# Patient Record
(demographics unavailable — no encounter records)

---

## 2025-03-11 NOTE — HISTORY OF PRESENT ILLNESS
[FreeTextEntry1] : CC: 5 y 1 mo right handed boy with polycystic kidney and liver disease and seizures. Herer for a first visit.  HPI: Geo's parents are seeking care in regard to seizure activity. On 2025, Geo had a first witnessed seizure. The seizure occurred out of the asleep state, and onset was not witnessed ( heard "strange noises" when walking near his bedroom and walked into him having a convulsion). The child was noted to have "blue lips, eye rolling, drooling", a small abrasion lesion on center-left forehead.  He was taken to [facility] ER and completed an EEG which was abnormal, with generalized epileptiform discharges noted (no seizures captured). He was sent home on generic Levetiracetam. No further overt seizures noted, but he is having occasional episodes of "spacing out", of unclear nature. He initially had some possible behavioral side effects from the generic Levetiracetam, but these are now resolved.  General health is good, but he is being followed due to recurrent GI symptoms (presumed to be related to milk protein allergy), polycystic liver and kidney disease, and Iron deficiency anemia. Sleep is good, through the night. Parents have an audio-video monitor in his bedroom, for safety. He usually sleeps from around 8:30 PM to around 7 AM. Developmentally, he is doing well, but he is receiving speech therapy and physical therapy. At 5 y 1 mo, Geo knows colors, speaks in sentences, has symbolic playskills, knows colors. Has been receiving multimodal therapies since early childhood (first via EI program, now receiving speech therapy and physical therapy) Academically, he is in PreK. Classroom has 8 students.  Current CNS medications: Generic Levetiractam 4 ml BID PRN intrarectal Diazepam as rescue   history: Born at [facility] via VD BW 6 p No  complications No NICU stay  Developmental history: First steps 18 mo First words 15 mo Toilet trained at 3 y Has been receiving multimodal therapies since early childhood (first via EI program, now receiving speech therapy and physical therapy)  Family history: Mom has polycystic liver and kidney disease Dad had ADD Maternal grandfather () had ALS and polycystic liver and kidney disease Paternal aunt with ADD Paternal grandfather with ADD  Social history: Lives with parents No pets Goes to school  Past medical history: Developmental delays Polycystic kidney and liver disease Milk protein allergy Generalized epilepsy Paroxysmal events of unclear nature ("spacing out")   Review of systems: General: No weight loss, weakness or recent fevers. Skin: No rashes, lumps, itching, color change, changes in hair/nails Head: No headaches, no head injury Eyes: No corrective eyeglasses. No discharge Ears: No discharge Nose/Sinuses: No congestion, discharge, epistaxis Mouth/Throat: Normal teeth and gums, no sore throat, hoarseness Neck: No lumps, pain, stiffness Respiratory: No cough, SOB, hemoptysis Cardiac: No edema GI: Loose stools : No hematuria Musculoskeletal: No joint inflammation or arthralgia Neuro: Seizures. Developmental lags. Psych: No mood, personality or behavioral concerns.  Physical Exam: HC 51.5 cm Well nourished non dysmorphic boy in no distress Face is symmetric Neck has full range of motion. No meningism. No torticollis or webbing Skin is clear of stigmata Hair has normal consistency, appearance, distribution Chest is symmetric Good air entry bilaterally. S1 S2 present, no murmur Abdomen soft, non tender, non distended Back has no deformities, no scoliosis, kyphosis or lordosis Awake, alert, great eye contact Speaks in full sentences Follows commands well Symbolic play skills Has joined attention Mimics social behavior Intact extraocular movements Pupils equal and reactive to light No nystagmus Unable to assess fundi Normal Rinne  Normal muscle tone and bulk   Muscle strength 5/5 in 4 limbs distally and proximally: walks, jumps, climbs, hops once each foot Romberg negative No dysmetria No ataxia No abnormal movements Gait is normal in stride, thong, and stance.   Tip-toe, heel and forward tandem walk intact No Gowers DTR 2+ in 4 limbs  Assessment: 5 y 1 mo right handed boy with early childhood onset mild developmental delays, milk protein allergy, Iron deficiency anemia, polycystic kidney and liver disease, generalized epilepsy, and paroxysmal events of unclear nature ("spacing out").  Plan: Geo's visit today had a duration of 60 minutes (>50% of which was spent in direct counseling and coordination of his care). I personally reviewed Geo's medical history, available outside medical records, tests results, recent developments, and then delineated next steps for his neurological care.  Geo's parents and I reviewed childhood onset generalized epilepsies in general, different treatment modalities, co morbidities and overall prognosis. Epilepsy is a chronic illness with potential for injury that poses a threat to life or bodily function.  We reviewed his current CNS medications' side effects profile, seizure action plan, acute management of breakthrough seizures with rescue medications, seizure precautions, medication adherence, common seizure triggers, nighttime safety, and the rationale behind monitoring trough levels.  1)	Continue generic Levetiracetam 4 ml BID 2)	Discontinue PRN intrarectal Diazepam as rescue 3)	PRN intrabuccal Libervant 5 mg as rescue for seizures over 3 minutes 4)	Brain MRI by summer/2025 5)	Referred to Neuro-genetics 6)	Levetiracetam trough level 7)	Audio-video monitor in his bedroom, for nighttime safety 8)	Continue multimodal therapies 9)	May need inpatient video EEG monitoring to capture and characterize the "spacing out" events, based on their evolution 10)	 Follow up 4 mo  Geo's parents understand plan, agree and want to move forward. All of their questions were answered. Geo's controlled substance history was obtained from the [place] state prescription monitoring program registry.  Laurie Levy MD Pediatric Neurologist and Clinical Neurophysiologist Director Pediatric Epilepsy [facility] at [facility] Clinical Professor of Neurology and Pediatrics, [facility] at [facility]

## 2025-06-27 NOTE — HISTORY OF PRESENT ILLNESS
[FreeTextEntry1] : CC: 5 y 4 mo right handed boy with early childhood onset mild developmental delays, milk protein allergy, Iron deficiency anemia, polycystic kidney and liver disease, generalized epilepsy, and paroxysmal events of unclear nature ("spacing out"). Here for a follow up visit.  Interval history; Since last seen, Geo's overt seizures have been well controlled (none seen since 2025). He is having occasional episodes of abrupt onset decreased relatedness, of unclear nature. Today's routine EEG ([facility]) revealed mild generalized background slowing. For seizure control, he is on monotherapy with generic Levetiracetam, without side effects.  General health is good, but he is being followed due to recurrent GI symptoms (presumed to be related to milk protein allergy), polycystic liver and kidney disease, and Iron deficiency anemia. Sleep is good, through the night. Parents have an audio-video monitor in his bedroom, for safety. He usually sleeps from around 8:30 PM to around 7 AM. Developmentally, he is doing well, but he is receiving speech therapy and physical therapy. At 5 y 4 mo, Geo knows colors, speaks in sentences, has symbolic playskills, knows colors. Has been receiving multimodal therapies since early childhood (first via EI program, now receiving speech therapy and physical therapy) Academically, he just completed PreK. Classroom had 8 students.  Current CNS medications: Generic Levetiractam 4 ml BID PRN intrabuccal Libervant 5 mg as rescue  HPI: I first met Geo in 3/2025, when parents sought care in regard to seizure activity. On 2025, Geo had a first witnessed seizure. The seizure occurred out of the asleep state, and onset was not witnessed ( heard "strange noises" when walking near his bedroom and walked into him having a convulsion). The child was noted to have "blue lips, eye rolling, drooling", a small abrasion lesion on center-left forehead. He was taken to [facility] ER and completed an EEG which was abnormal, with generalized epileptiform discharges noted (no seizures captured). He was sent home on generic Levetiracetam. No further overt seizures noted, but he is having occasional episodes of "spacing out", of unclear nature. He initially had some possible behavioral side effects from the generic Levetiracetam, but these are now resolved.  Good general health, but being followed due to recurrent GI symptoms (presumed to be related to milk protein allergy), polycystic liver and kidney disease, and Iron deficiency anemia. Good sleep. No developmental concerns, but has been receiving multimodal therapies since early childhood (first via EI program, now receiving speech therapy and physical therapy)  Prior CNS medications: PRN intrarectal Diazepam   history: Born at [facility] via VD BW 6 p No  complications No NICU stay  Developmental history: First steps 18 mo First words 15 mo Toilet trained at 3 y Has been receiving multimodal therapies since early childhood (first via EI program, now receiving speech therapy and physical therapy)  Family history: Mom has polycystic liver and kidney disease Dad had ADD Maternal grandfather () had ALS and polycystic liver and kidney disease Paternal aunt with ADD Paternal grandfather with ADD  Social history: Lives with parents No pets Goes to school  Past medical history: Developmental delays Polycystic kidney and liver disease Milk protein allergy Generalized epilepsy Paroxysmal events of unclear nature ("spacing out")  Review of systems: General: No weight loss, weakness or recent fevers. Skin: No rashes, lumps, itching, color change, changes in hair/nails Head: No headaches, no head injury Eyes: No corrective eyeglasses. No discharge Ears: No discharge Nose/Sinuses: No congestion, discharge, epistaxis Mouth/Throat: Normal teeth and gums, no sore throat, hoarseness Neck: No lumps, pain, stiffness Respiratory: No cough, SOB, hemoptysis Cardiac: No edema GI: Loose stools : No hematuria Musculoskeletal: No joint inflammation or arthralgia Neuro: Seizures. Developmental lags. Psych: No mood, personality or behavioral concerns.  Physical Exam: HC 51.5 cm Well nourished non dysmorphic boy in no distress Face is symmetric Neck has full range of motion. No meningism. No torticollis or webbing Skin is clear of stigmata Hair has normal consistency, appearance, distribution Awake, alert, great eye contact Speaks in full sentences Follows commands well Symbolic play skills Has joined attention Mimics social behavior Intact extraocular movements No nystagmus Normal muscle tone and bulk Muscle strength 5/5 in 4 limbs distally and proximally: walks, jumps, climbs, hops once each foot Romberg negative No dysmetria No ataxia No abnormal movements Gait is normal in stride, thong, and stance. Tip-toe, heel and forward tandem walk intact No Gowers DTR deferred  Assessment: 5 y 4 mo right handed boy with early childhood onset mild developmental delays, milk protein allergy, Iron deficiency anemia, polycystic kidney and liver disease, generalized epilepsy, and paroxysmal events of unclear nature ("spacing out"). Exhibiting unclear seizure control while on monotherapy with generic Levetiracetam.  Plan: Geo's visit today had a duration of 40 minutes (>50% of which was spent in direct counseling and coordination of his care). I personally reviewed Geo's medical history, medical records, tests results, recent developments, and then delineated next steps for his neurological care. Geo's parents (dad was available over the phone) and I reviewed childhood onset generalized epilepsies in general, different treatment modalities, co morbidities and overall prognosis. Epilepsy is a chronic illness with potential for injury that poses a threat to life or bodily function. We reviewed his current CNS medications' side effects profile, seizure action plan, acute management of breakthrough seizures with rescue medications, seizure precautions, medication adherence, common seizure triggers, nighttime safety, and the rationale behind monitoring trough levels.  1) Continue generic Levetiracetam 4 ml BID 2) Discontinue PRN intrabuccal Libervant 5 mg as rescue for seizures over 3 minutes (no longer on market) 3) PRN intranasal Valtoco 5 mg as rescue for seizures over 3 minutes 4) Brain MRI by summer/fall  5) Referred to Neuro-genetics 6) Levetiracetam trough level every 4-5 mo. Due next 2025 7) Audio-video monitor in his bedroom, for nighttime safety 8) Continue multimodal therapies 9) May need inpatient video EEG monitoring to capture and characterize the "spacing out" events, based on their evolution 10)  Follow up 5-6 mo  Geo's parents understand plan, agree and want to move forward. All of their questions were answered. Geo's controlled substance history was obtained from the [place] state prescription monitoring program registry.  Laurie Levy MD Pediatric Neurologist and Clinical Neurophysiologist Director Pediatric Epilepsy [facility] at [facility] Clinical Professor of Neurology and Pediatrics, [facility] at [facility]